# Patient Record
Sex: FEMALE | Race: BLACK OR AFRICAN AMERICAN | NOT HISPANIC OR LATINO
[De-identification: names, ages, dates, MRNs, and addresses within clinical notes are randomized per-mention and may not be internally consistent; named-entity substitution may affect disease eponyms.]

---

## 2022-05-26 ENCOUNTER — RESULT REVIEW (OUTPATIENT)
Age: 52
End: 2022-05-26

## 2022-06-07 ENCOUNTER — APPOINTMENT (OUTPATIENT)
Dept: MAMMOGRAPHY | Facility: HOSPITAL | Age: 52
End: 2022-06-07

## 2022-07-11 PROBLEM — Z00.00 ENCOUNTER FOR PREVENTIVE HEALTH EXAMINATION: Status: ACTIVE | Noted: 2022-07-11

## 2022-07-12 ENCOUNTER — OUTPATIENT (OUTPATIENT)
Dept: OUTPATIENT SERVICES | Facility: HOSPITAL | Age: 52
LOS: 1 days | End: 2022-07-12

## 2022-07-12 VITALS
TEMPERATURE: 98 F | DIASTOLIC BLOOD PRESSURE: 70 MMHG | SYSTOLIC BLOOD PRESSURE: 110 MMHG | HEART RATE: 77 BPM | OXYGEN SATURATION: 98 % | WEIGHT: 259.93 LBS | HEIGHT: 64.5 IN | RESPIRATION RATE: 16 BRPM

## 2022-07-12 DIAGNOSIS — Z98.891 HISTORY OF UTERINE SCAR FROM PREVIOUS SURGERY: Chronic | ICD-10-CM

## 2022-07-12 DIAGNOSIS — D05.11 INTRADUCTAL CARCINOMA IN SITU OF RIGHT BREAST: ICD-10-CM

## 2022-07-12 DIAGNOSIS — C50.919 MALIGNANT NEOPLASM OF UNSPECIFIED SITE OF UNSPECIFIED FEMALE BREAST: ICD-10-CM

## 2022-07-12 DIAGNOSIS — Z98.890 OTHER SPECIFIED POSTPROCEDURAL STATES: Chronic | ICD-10-CM

## 2022-07-12 LAB
ALBUMIN SERPL ELPH-MCNC: 4.3 G/DL — SIGNIFICANT CHANGE UP (ref 3.3–5)
ALP SERPL-CCNC: 96 U/L — SIGNIFICANT CHANGE UP (ref 40–120)
ALT FLD-CCNC: 31 U/L — SIGNIFICANT CHANGE UP (ref 4–33)
ANION GAP SERPL CALC-SCNC: 11 MMOL/L — SIGNIFICANT CHANGE UP (ref 7–14)
AST SERPL-CCNC: 25 U/L — SIGNIFICANT CHANGE UP (ref 4–32)
BILIRUB SERPL-MCNC: 0.3 MG/DL — SIGNIFICANT CHANGE UP (ref 0.2–1.2)
BUN SERPL-MCNC: 17 MG/DL — SIGNIFICANT CHANGE UP (ref 7–23)
CALCIUM SERPL-MCNC: 9.4 MG/DL — SIGNIFICANT CHANGE UP (ref 8.4–10.5)
CHLORIDE SERPL-SCNC: 102 MMOL/L — SIGNIFICANT CHANGE UP (ref 98–107)
CO2 SERPL-SCNC: 27 MMOL/L — SIGNIFICANT CHANGE UP (ref 22–31)
CREAT SERPL-MCNC: 0.84 MG/DL — SIGNIFICANT CHANGE UP (ref 0.5–1.3)
EGFR: 83 ML/MIN/1.73M2 — SIGNIFICANT CHANGE UP
GLUCOSE SERPL-MCNC: 77 MG/DL — SIGNIFICANT CHANGE UP (ref 70–99)
HCG SERPL-ACNC: <5 MIU/ML — SIGNIFICANT CHANGE UP
HCT VFR BLD CALC: 38.4 % — SIGNIFICANT CHANGE UP (ref 34.5–45)
HGB BLD-MCNC: 12.1 G/DL — SIGNIFICANT CHANGE UP (ref 11.5–15.5)
MCHC RBC-ENTMCNC: 28.8 PG — SIGNIFICANT CHANGE UP (ref 27–34)
MCHC RBC-ENTMCNC: 31.5 GM/DL — LOW (ref 32–36)
MCV RBC AUTO: 91.4 FL — SIGNIFICANT CHANGE UP (ref 80–100)
NRBC # BLD: 0 /100 WBCS — SIGNIFICANT CHANGE UP
NRBC # FLD: 0 K/UL — SIGNIFICANT CHANGE UP
PLATELET # BLD AUTO: 209 K/UL — SIGNIFICANT CHANGE UP (ref 150–400)
POTASSIUM SERPL-MCNC: 4 MMOL/L — SIGNIFICANT CHANGE UP (ref 3.5–5.3)
POTASSIUM SERPL-SCNC: 4 MMOL/L — SIGNIFICANT CHANGE UP (ref 3.5–5.3)
PROT SERPL-MCNC: 8 G/DL — SIGNIFICANT CHANGE UP (ref 6–8.3)
RBC # BLD: 4.2 M/UL — SIGNIFICANT CHANGE UP (ref 3.8–5.2)
RBC # FLD: 13.7 % — SIGNIFICANT CHANGE UP (ref 10.3–14.5)
SODIUM SERPL-SCNC: 140 MMOL/L — SIGNIFICANT CHANGE UP (ref 135–145)
WBC # BLD: 6.48 K/UL — SIGNIFICANT CHANGE UP (ref 3.8–10.5)
WBC # FLD AUTO: 6.48 K/UL — SIGNIFICANT CHANGE UP (ref 3.8–10.5)

## 2022-07-12 PROCEDURE — 93010 ELECTROCARDIOGRAM REPORT: CPT

## 2022-07-12 NOTE — H&P PST ADULT - NSICDXPASTMEDICALHX_GEN_ALL_CORE_FT
PAST MEDICAL HISTORY:  2019 novel coronavirus disease (COVID-19)     Breast CA right    HTN (hypertension)     Obesity     Osteoarthritis

## 2022-07-12 NOTE — H&P PST ADULT - PROBLEM SELECTOR PLAN 1
Pt scheduled for surgery Right Breast Lumpectomy with Mammo Localization Right Breast Ductal Ca in Situ  1 Site with Dr Jackson tentatively 7/21/22  and preop instructions including instructions for taking Famotidine and for Chlorhexidine use in showering on the day of surgery, given verbally and with use of  written materials, and patient confirming understanding of such instructions using  teach back method.  Pt given information for COVID PCR testing sites and told to make appointment 3-5 days preop   Pt takes HTN meds in evening

## 2022-07-12 NOTE — H&P PST ADULT - HISTORY OF PRESENT ILLNESS
Pt is a 51 yr old female scheduled for   Pt given information for COVID PCR testing sites and told to make appointment 3-5 days preop  Pt is a 51 yr old female scheduled for Right Breast Lumpectomy with Mammo Localization Right Breast Ductal Ca in Situ  1 Site with Dr Jackson tentatively 7/21/22 - pt noted to have abnormal right breast mass on routine Mammo 4/22 - pt denies palpation or pain or breast changes. Hx HTN   Pt given information for COVID PCR testing sites and told to make appointment 3-5 days preop

## 2022-07-12 NOTE — H&P PST ADULT - NSANTHOSAYNRD_GEN_A_CORE
No. JH screening performed.  STOP BANG Legend: 0-2 = LOW Risk; 3-4 = INTERMEDIATE Risk; 5-8 = HIGH Risk

## 2022-07-12 NOTE — H&P PST ADULT - ACTIVITY
Pt states he can walk 3 to 4 blocks or climb 2 flights stairs without SOB or CP Pt states she can walk 3 to 4 blocks or climb 2 flights stairs without SOB or CP

## 2022-07-20 ENCOUNTER — TRANSCRIPTION ENCOUNTER (OUTPATIENT)
Age: 52
End: 2022-07-20

## 2022-07-20 VITALS
RESPIRATION RATE: 18 BRPM | SYSTOLIC BLOOD PRESSURE: 103 MMHG | HEIGHT: 64.5 IN | WEIGHT: 259.93 LBS | TEMPERATURE: 97 F | OXYGEN SATURATION: 100 % | DIASTOLIC BLOOD PRESSURE: 68 MMHG | HEART RATE: 60 BPM

## 2022-07-20 NOTE — ASU PREOPERATIVE ASSESSMENT, ADULT (IPARK ONLY) - TEACHING/LEARNING FACTORS IMPACT ABILITY TO LEARN
The patient has received written discharge instructions for Warfarin/Coumadin, including the Warfarin/Coumadin discharge booklet, which contains all of the information listed below. Warfarin/Coumadin is used to prevent new blood clots and keep existing ones from getting bigger. Never skip a dose of Warfarin/Coumadin. If you forget to take your Warfarin/Coumadin, DO NOT take an extra pill to 'catch up'. NEVER TAKE A DOUBLE DOSE. Notify your doctor that you missed a dose. Take Warfarin/Coumadin in the evening at the same time. Warfarin/Coumadin may be taken with other medications or food.
none

## 2022-07-20 NOTE — ASU PREOPERATIVE ASSESSMENT, ADULT (IPARK ONLY) - FALL HARM RISK - UNIVERSAL INTERVENTIONS
Bed in lowest position, wheels locked, appropriate side rails in place/Call bell, personal items and telephone in reach/Instruct patient to call for assistance before getting out of bed or chair/Non-slip footwear when patient is out of bed/[place] to call system/Physically safe environment - no spills, clutter or unnecessary equipment/Purposeful Proactive Rounding/Room/bathroom lighting operational, light cord in reach

## 2022-07-21 ENCOUNTER — APPOINTMENT (OUTPATIENT)
Dept: MAMMOGRAPHY | Facility: IMAGING CENTER | Age: 52
End: 2022-07-21

## 2022-07-21 ENCOUNTER — OUTPATIENT (OUTPATIENT)
Dept: OUTPATIENT SERVICES | Facility: HOSPITAL | Age: 52
LOS: 1 days | Discharge: HOME | End: 2022-07-21

## 2022-07-21 ENCOUNTER — TRANSCRIPTION ENCOUNTER (OUTPATIENT)
Age: 52
End: 2022-07-21

## 2022-07-21 ENCOUNTER — RESULT REVIEW (OUTPATIENT)
Age: 52
End: 2022-07-21

## 2022-07-21 ENCOUNTER — OUTPATIENT (OUTPATIENT)
Dept: OUTPATIENT SERVICES | Facility: HOSPITAL | Age: 52
LOS: 1 days | End: 2022-07-21
Payer: COMMERCIAL

## 2022-07-21 VITALS
DIASTOLIC BLOOD PRESSURE: 68 MMHG | OXYGEN SATURATION: 99 % | HEART RATE: 68 BPM | SYSTOLIC BLOOD PRESSURE: 115 MMHG | RESPIRATION RATE: 14 BRPM

## 2022-07-21 DIAGNOSIS — Z00.8 ENCOUNTER FOR OTHER GENERAL EXAMINATION: ICD-10-CM

## 2022-07-21 DIAGNOSIS — Z98.890 OTHER SPECIFIED POSTPROCEDURAL STATES: Chronic | ICD-10-CM

## 2022-07-21 DIAGNOSIS — Z98.891 HISTORY OF UTERINE SCAR FROM PREVIOUS SURGERY: Chronic | ICD-10-CM

## 2022-07-21 DIAGNOSIS — D05.11 INTRADUCTAL CARCINOMA IN SITU OF RIGHT BREAST: ICD-10-CM

## 2022-07-21 PROBLEM — I10 ESSENTIAL (PRIMARY) HYPERTENSION: Chronic | Status: ACTIVE | Noted: 2022-07-12

## 2022-07-21 PROBLEM — U07.1 COVID-19: Chronic | Status: ACTIVE | Noted: 2022-07-12

## 2022-07-21 PROBLEM — E66.9 OBESITY, UNSPECIFIED: Chronic | Status: ACTIVE | Noted: 2022-07-12

## 2022-07-21 PROBLEM — C50.919 MALIGNANT NEOPLASM OF UNSPECIFIED SITE OF UNSPECIFIED FEMALE BREAST: Chronic | Status: ACTIVE | Noted: 2022-07-12

## 2022-07-21 PROBLEM — M19.90 UNSPECIFIED OSTEOARTHRITIS, UNSPECIFIED SITE: Chronic | Status: ACTIVE | Noted: 2022-07-12

## 2022-07-21 PROCEDURE — 88341 IMHCHEM/IMCYTCHM EA ADD ANTB: CPT | Mod: 26

## 2022-07-21 PROCEDURE — 76098 X-RAY EXAM SURGICAL SPECIMEN: CPT | Mod: 26

## 2022-07-21 PROCEDURE — 88342 IMHCHEM/IMCYTCHM 1ST ANTB: CPT | Mod: 26

## 2022-07-21 PROCEDURE — 19281 PERQ DEVICE BREAST 1ST IMAG: CPT | Mod: RT

## 2022-07-21 PROCEDURE — C1889: CPT

## 2022-07-21 PROCEDURE — C1739: CPT

## 2022-07-21 PROCEDURE — 19281 PERQ DEVICE BREAST 1ST IMAG: CPT

## 2022-07-21 PROCEDURE — 76098 X-RAY EXAM SURGICAL SPECIMEN: CPT

## 2022-07-21 PROCEDURE — 88307 TISSUE EXAM BY PATHOLOGIST: CPT | Mod: 26

## 2022-07-21 NOTE — BRIEF OPERATIVE NOTE - OPERATION/FINDINGS
Mammography scans were studied prior to beginning the procedure. The patient was prepped and draped in a sterile fashion. An incision was made transversely across the breast tissue. Using electrocautery, we dissected to and identified the localizing wire. The tumor and wire were excised. The tumor was sent to radiology to confirm clips in specimen. We closed the incision with chromic gut for the deep dermal layers and monocryl on the skin layer.

## 2022-07-21 NOTE — ASU DISCHARGE PLAN (ADULT/PEDIATRIC) - CARE PROVIDER_API CALL
Pennie Jackson)  Surgery  [address], [address]  [place] [address]  Phone: [phone]  Fax: [phone]  Follow Up Time: 2 weeks

## 2022-07-21 NOTE — ASU DISCHARGE PLAN (ADULT/PEDIATRIC) - ASU DC SPECIAL INSTRUCTIONSFT
Take Tylenol up to 975mg and Ibuprofen up to 600mg every 6 hours, alternating so that you take one medication, then the other every 3 hours. Use Oxycodone only for severe pain not controlled with the Tylenol and Ibuprofen.     You may shower. Pat Dry abdomen. Leave the white steri strips in place, they will fall off on their own in approximately 5-7 days.

## 2022-07-21 NOTE — ASU DISCHARGE PLAN (ADULT/PEDIATRIC) - NS MD DC FALL RISK RISK
For information on Fall & Injury Prevention, visit: [email or web address] OR  [email or web address] OR  https://www.cdc.gov/steadi/patient.html

## 2022-07-27 LAB — SURGICAL PATHOLOGY STUDY: SIGNIFICANT CHANGE UP

## 2022-08-05 ENCOUNTER — OUTPATIENT (OUTPATIENT)
Dept: OUTPATIENT SERVICES | Facility: HOSPITAL | Age: 52
LOS: 1 days | End: 2022-08-05

## 2022-08-05 VITALS
DIASTOLIC BLOOD PRESSURE: 82 MMHG | HEART RATE: 75 BPM | SYSTOLIC BLOOD PRESSURE: 126 MMHG | WEIGHT: 259.93 LBS | RESPIRATION RATE: 16 BRPM | HEIGHT: 64.5 IN | OXYGEN SATURATION: 98 % | TEMPERATURE: 98 F

## 2022-08-05 DIAGNOSIS — C50.911 MALIGNANT NEOPLASM OF UNSPECIFIED SITE OF RIGHT FEMALE BREAST: ICD-10-CM

## 2022-08-05 DIAGNOSIS — C50.919 MALIGNANT NEOPLASM OF UNSPECIFIED SITE OF UNSPECIFIED FEMALE BREAST: ICD-10-CM

## 2022-08-05 DIAGNOSIS — Z98.890 OTHER SPECIFIED POSTPROCEDURAL STATES: Chronic | ICD-10-CM

## 2022-08-05 DIAGNOSIS — Z98.891 HISTORY OF UTERINE SCAR FROM PREVIOUS SURGERY: Chronic | ICD-10-CM

## 2022-08-05 LAB
HCG SERPL-ACNC: <5 MIU/ML — SIGNIFICANT CHANGE UP
HCT VFR BLD CALC: 37.5 % — SIGNIFICANT CHANGE UP (ref 34.5–45)
HGB BLD-MCNC: 11.8 G/DL — SIGNIFICANT CHANGE UP (ref 11.5–15.5)
MCHC RBC-ENTMCNC: 28.6 PG — SIGNIFICANT CHANGE UP (ref 27–34)
MCHC RBC-ENTMCNC: 31.5 GM/DL — LOW (ref 32–36)
MCV RBC AUTO: 91 FL — SIGNIFICANT CHANGE UP (ref 80–100)
NRBC # BLD: 0 /100 WBCS — SIGNIFICANT CHANGE UP
NRBC # FLD: 0 K/UL — SIGNIFICANT CHANGE UP
PLATELET # BLD AUTO: 204 K/UL — SIGNIFICANT CHANGE UP (ref 150–400)
RBC # BLD: 4.12 M/UL — SIGNIFICANT CHANGE UP (ref 3.8–5.2)
RBC # FLD: 14 % — SIGNIFICANT CHANGE UP (ref 10.3–14.5)
WBC # BLD: 6.72 K/UL — SIGNIFICANT CHANGE UP (ref 3.8–10.5)
WBC # FLD AUTO: 6.72 K/UL — SIGNIFICANT CHANGE UP (ref 3.8–10.5)

## 2022-08-05 RX ORDER — SODIUM CHLORIDE 9 MG/ML
1000 INJECTION, SOLUTION INTRAVENOUS
Refills: 0 | Status: DISCONTINUED | OUTPATIENT
Start: 2022-08-16 | End: 2022-08-30

## 2022-08-05 NOTE — H&P PST ADULT - BREASTS COMMENTS
No redness or swelling noted at biopsy site right lateral breast Surgical incision healing well - no drainage or bleeding - pt admits to occasional discomfort

## 2022-08-05 NOTE — H&P PST ADULT - SKIN/BREAST COMMENTS
S/P right breast lumpectomy - surgical scar healed - no drainage noted - pt c/o of mild pain at times - right axillary lymph node to be biopsied and additional breast tissue removed to get clean margins

## 2022-08-05 NOTE — H&P PST ADULT - HISTORY OF PRESENT ILLNESS
Pt is a 51 yr old female scheduled for Right Breast Lumpectomy with Mammo Localization Right Breast Ductal Ca in Situ  1 Site with Dr Jackson tentatively 7/21/22 - pt noted to have abnormal right breast mass on routine Mammo 4/22 - pt denies palpation or pain or breast changes. Hx HTN   Pt given information for COVID PCR testing sites and told to make appointment 3-5 days preop  Pt is a 51 yr old female scheduled for Re Excision of Tumor Site Right [place] Node Biopsy Right Breast Ca with Dr Jackson tentatively 8/16/22 - pt s/p right breast lumpectomy 7/21/22 and now needs margins cleared. Pt reports good healing and denies drainage or swelling or surgical incision. Hx HTN, obesity .  Pt given information for COVID PCR testing sites and told to make appointment 3-5 days preop

## 2022-08-05 NOTE — H&P PST ADULT - DECREASED STRENGTH DETAIL
Pt c/o right knee pain with walking / wt bearing - slight swelling noted Pt c/o b/l  knee pain with walking / wt bearing - slight swelling noted left knee

## 2022-08-05 NOTE — H&P PST ADULT - BMI (KG/M2)
Handoff Report from Operating Room to PACU    Report received from GILL Chowdary RN and Prisca Salcedo CRNA regarding Elodia Jorgensen. Surgeon(s):  Refugio Calderon MD  And Procedure(s) (LRB):  RIGHT UNICONDYLAR KNEE ARTHROPLASTY (Right)  confirmed   with allergies, dressings and local anesthetic discussed. Anesthesia type, drugs, patient history, complications, estimated blood loss, vital signs, intake and output, and last pain medication, lines, reversal medications and temperature were reviewed. 43.9

## 2022-08-15 ENCOUNTER — TRANSCRIPTION ENCOUNTER (OUTPATIENT)
Age: 52
End: 2022-08-15

## 2022-08-16 ENCOUNTER — APPOINTMENT (OUTPATIENT)
Dept: NUCLEAR MEDICINE | Facility: HOSPITAL | Age: 52
End: 2022-08-16

## 2022-08-16 ENCOUNTER — OUTPATIENT (OUTPATIENT)
Dept: OUTPATIENT SERVICES | Facility: HOSPITAL | Age: 52
LOS: 1 days | Discharge: HOME | End: 2022-08-16

## 2022-08-16 ENCOUNTER — RESULT REVIEW (OUTPATIENT)
Age: 52
End: 2022-08-16

## 2022-08-16 ENCOUNTER — TRANSCRIPTION ENCOUNTER (OUTPATIENT)
Age: 52
End: 2022-08-16

## 2022-08-16 VITALS
OXYGEN SATURATION: 100 % | HEART RATE: 70 BPM | SYSTOLIC BLOOD PRESSURE: 129 MMHG | DIASTOLIC BLOOD PRESSURE: 83 MMHG | RESPIRATION RATE: 15 BRPM

## 2022-08-16 VITALS
TEMPERATURE: 98 F | HEIGHT: 64.5 IN | SYSTOLIC BLOOD PRESSURE: 121 MMHG | DIASTOLIC BLOOD PRESSURE: 87 MMHG | WEIGHT: 259.93 LBS | OXYGEN SATURATION: 97 % | RESPIRATION RATE: 15 BRPM | HEART RATE: 75 BPM

## 2022-08-16 DIAGNOSIS — Z98.890 OTHER SPECIFIED POSTPROCEDURAL STATES: Chronic | ICD-10-CM

## 2022-08-16 DIAGNOSIS — Z98.891 HISTORY OF UTERINE SCAR FROM PREVIOUS SURGERY: Chronic | ICD-10-CM

## 2022-08-16 DIAGNOSIS — C50.911 MALIGNANT NEOPLASM OF UNSPECIFIED SITE OF RIGHT FEMALE BREAST: ICD-10-CM

## 2022-08-16 LAB — HCG UR QL: NEGATIVE — SIGNIFICANT CHANGE UP

## 2022-08-16 PROCEDURE — 88307 TISSUE EXAM BY PATHOLOGIST: CPT | Mod: 26

## 2022-08-16 PROCEDURE — 88331 PATH CONSLTJ SURG 1 BLK 1SPC: CPT | Mod: 26

## 2022-08-16 PROCEDURE — 88332 PATH CONSLTJ SURG EA ADD BLK: CPT | Mod: 26

## 2022-08-16 RX ORDER — LISINOPRIL 2.5 MG/1
20 TABLET ORAL DAILY
Refills: 0 | Status: DISCONTINUED | OUTPATIENT
Start: 2022-08-16 | End: 2022-08-30

## 2022-08-16 RX ORDER — FENTANYL CITRATE 50 UG/ML
25 INJECTION INTRAVENOUS
Refills: 0 | Status: DISCONTINUED | OUTPATIENT
Start: 2022-08-16 | End: 2022-08-16

## 2022-08-16 RX ORDER — AMLODIPINE BESYLATE 2.5 MG/1
10 TABLET ORAL DAILY
Refills: 0 | Status: DISCONTINUED | OUTPATIENT
Start: 2022-08-16 | End: 2022-08-30

## 2022-08-16 RX ORDER — OXYCODONE HYDROCHLORIDE 5 MG/1
5 TABLET ORAL ONCE
Refills: 0 | Status: DISCONTINUED | OUTPATIENT
Start: 2022-08-16 | End: 2022-08-16

## 2022-08-16 RX ORDER — OXYCODONE HYDROCHLORIDE 5 MG/1
10 TABLET ORAL ONCE
Refills: 0 | Status: DISCONTINUED | OUTPATIENT
Start: 2022-08-16 | End: 2022-08-16

## 2022-08-16 RX ORDER — ACETAMINOPHEN 500 MG
650 TABLET ORAL ONCE
Refills: 0 | Status: COMPLETED | OUTPATIENT
Start: 2022-08-16 | End: 2022-08-16

## 2022-08-16 RX ORDER — ONDANSETRON 8 MG/1
4 TABLET, FILM COATED ORAL ONCE
Refills: 0 | Status: DISCONTINUED | OUTPATIENT
Start: 2022-08-16 | End: 2022-08-30

## 2022-08-16 RX ORDER — FENTANYL CITRATE 50 UG/ML
50 INJECTION INTRAVENOUS
Refills: 0 | Status: DISCONTINUED | OUTPATIENT
Start: 2022-08-16 | End: 2022-08-16

## 2022-08-16 RX ADMIN — Medication 650 MILLIGRAM(S): at 12:14

## 2022-08-16 NOTE — ASU DISCHARGE PLAN (ADULT/PEDIATRIC) - NS MD DC FALL RISK RISK
For information on Fall & Injury Prevention, visit: [email or web address] OR  [email or web address] OR  https://www.cdc.gov/steadi/patient.html

## 2022-08-16 NOTE — ASU DISCHARGE PLAN (ADULT/PEDIATRIC) - CARE PROVIDER_API CALL
Pennie Jackson)  Surgery  [address], [address]  [place] [address]  Phone: [phone]  Fax: [phone]  Follow Up Time:

## 2022-08-16 NOTE — ASU PREOP CHECKLIST - AS BP NONINV SITE
Patient returning my call she stated that she have a referrals for her to see the eyes doctor at [facility], but she stated stated that she will make an appointment before her referrals expires next month  
right upper arm

## 2022-08-16 NOTE — BRIEF OPERATIVE NOTE - NSICDXBRIEFPROCEDURE_GEN_ALL_CORE_FT
PROCEDURES:  Lumpectomy of breast with radioactive seed localization and sentinel node biopsy 16-Aug-2022 10:37:50  Ivan Jackson

## 2022-08-22 LAB — SURGICAL PATHOLOGY STUDY: SIGNIFICANT CHANGE UP

## 2022-09-30 ENCOUNTER — OUTPATIENT (OUTPATIENT)
Dept: OUTPATIENT SERVICES | Facility: HOSPITAL | Age: 52
LOS: 1 days | End: 2022-09-30

## 2022-09-30 VITALS
TEMPERATURE: 97 F | WEIGHT: 263.01 LBS | DIASTOLIC BLOOD PRESSURE: 75 MMHG | RESPIRATION RATE: 16 BRPM | OXYGEN SATURATION: 96 % | HEIGHT: 63.5 IN | SYSTOLIC BLOOD PRESSURE: 111 MMHG | HEART RATE: 83 BPM

## 2022-09-30 DIAGNOSIS — Z98.890 OTHER SPECIFIED POSTPROCEDURAL STATES: Chronic | ICD-10-CM

## 2022-09-30 DIAGNOSIS — Z91.89 OTHER SPECIFIED PERSONAL RISK FACTORS, NOT ELSEWHERE CLASSIFIED: ICD-10-CM

## 2022-09-30 DIAGNOSIS — Z98.891 HISTORY OF UTERINE SCAR FROM PREVIOUS SURGERY: Chronic | ICD-10-CM

## 2022-09-30 DIAGNOSIS — E66.01 MORBID (SEVERE) OBESITY DUE TO EXCESS CALORIES: ICD-10-CM

## 2022-09-30 DIAGNOSIS — I10 ESSENTIAL (PRIMARY) HYPERTENSION: ICD-10-CM

## 2022-09-30 DIAGNOSIS — C50.911 MALIGNANT NEOPLASM OF UNSPECIFIED SITE OF RIGHT FEMALE BREAST: ICD-10-CM

## 2022-09-30 LAB
ALBUMIN SERPL ELPH-MCNC: 4.3 G/DL — SIGNIFICANT CHANGE UP (ref 3.3–5)
ALP SERPL-CCNC: 107 U/L — SIGNIFICANT CHANGE UP (ref 40–120)
ALT FLD-CCNC: 26 U/L — SIGNIFICANT CHANGE UP (ref 4–33)
ANION GAP SERPL CALC-SCNC: 12 MMOL/L — SIGNIFICANT CHANGE UP (ref 7–14)
AST SERPL-CCNC: 22 U/L — SIGNIFICANT CHANGE UP (ref 4–32)
BILIRUB SERPL-MCNC: <0.2 MG/DL — SIGNIFICANT CHANGE UP (ref 0.2–1.2)
BUN SERPL-MCNC: 20 MG/DL — SIGNIFICANT CHANGE UP (ref 7–23)
CALCIUM SERPL-MCNC: 9.2 MG/DL — SIGNIFICANT CHANGE UP (ref 8.4–10.5)
CHLORIDE SERPL-SCNC: 105 MMOL/L — SIGNIFICANT CHANGE UP (ref 98–107)
CO2 SERPL-SCNC: 25 MMOL/L — SIGNIFICANT CHANGE UP (ref 22–31)
CREAT SERPL-MCNC: 0.72 MG/DL — SIGNIFICANT CHANGE UP (ref 0.5–1.3)
EGFR: 100 ML/MIN/1.73M2 — SIGNIFICANT CHANGE UP
GLUCOSE SERPL-MCNC: 92 MG/DL — SIGNIFICANT CHANGE UP (ref 70–99)
HCG SERPL-ACNC: <5 MIU/ML — SIGNIFICANT CHANGE UP
HCT VFR BLD CALC: 36.5 % — SIGNIFICANT CHANGE UP (ref 34.5–45)
HGB BLD-MCNC: 11.6 G/DL — SIGNIFICANT CHANGE UP (ref 11.5–15.5)
MCHC RBC-ENTMCNC: 29 PG — SIGNIFICANT CHANGE UP (ref 27–34)
MCHC RBC-ENTMCNC: 31.8 GM/DL — LOW (ref 32–36)
MCV RBC AUTO: 91.3 FL — SIGNIFICANT CHANGE UP (ref 80–100)
NRBC # BLD: 0 /100 WBCS — SIGNIFICANT CHANGE UP (ref 0–0)
NRBC # FLD: 0 K/UL — SIGNIFICANT CHANGE UP (ref 0–0)
PLATELET # BLD AUTO: 200 K/UL — SIGNIFICANT CHANGE UP (ref 150–400)
POTASSIUM SERPL-MCNC: 3.6 MMOL/L — SIGNIFICANT CHANGE UP (ref 3.5–5.3)
POTASSIUM SERPL-SCNC: 3.6 MMOL/L — SIGNIFICANT CHANGE UP (ref 3.5–5.3)
PROT SERPL-MCNC: 7.7 G/DL — SIGNIFICANT CHANGE UP (ref 6–8.3)
RBC # BLD: 4 M/UL — SIGNIFICANT CHANGE UP (ref 3.8–5.2)
RBC # FLD: 13.6 % — SIGNIFICANT CHANGE UP (ref 10.3–14.5)
SODIUM SERPL-SCNC: 142 MMOL/L — SIGNIFICANT CHANGE UP (ref 135–145)
WBC # BLD: 5.97 K/UL — SIGNIFICANT CHANGE UP (ref 3.8–10.5)
WBC # FLD AUTO: 5.97 K/UL — SIGNIFICANT CHANGE UP (ref 3.8–10.5)

## 2022-09-30 RX ORDER — SODIUM CHLORIDE 9 MG/ML
1000 INJECTION, SOLUTION INTRAVENOUS
Refills: 0 | Status: DISCONTINUED | OUTPATIENT
Start: 2022-10-06 | End: 2022-10-20

## 2022-09-30 NOTE — H&P PST ADULT - HISTORY OF PRESENT ILLNESS
52 y/o female PMH Obesity HTN presents to presurgical testing with diagnosis of malignant neoplasm of unspecified site of right female breast scheduled for an insertion of infusaport. Pt with history of right breast lumpectomy in 7/2022 and re-excision of tumor from right breast in 8/2022.

## 2022-09-30 NOTE — H&P PST ADULT - MUSCULOSKELETAL
knee pain/ROM intact/no calf tenderness/normal gait/strength 5/5 bilateral upper extremities/decreased strength/abnormal gait details…

## 2022-09-30 NOTE — H&P PST ADULT - NSICDXPASTSURGICALHX_GEN_ALL_CORE_FT
PAST SURGICAL HISTORY:  H/O arthroscopy     H/O:      S/P lumpectomy of breast Right, 2022 and re-excision 2022

## 2022-09-30 NOTE — H&P PST ADULT - PROBLEM SELECTOR PLAN 1
Patient tentatively scheduled for insertion of infusaport for 10/6/22. Pre-op instructions provided. Pt given verbal and written instructions with teach back on chlorhexidine shampoo and pepcid. Urine cup provided for day of procedure pregnancy test. Pt verbalized understanding with return demonstration.     Pt with +home test COVID-19 on 9/1/22, retested 5-6 days later with another home test which was negative.  Pt states surgeon is aware. Pt instructed to obtain a PCR tomorrow and follow up with surgeon.

## 2022-09-30 NOTE — H&P PST ADULT - PROBLEM SELECTOR PLAN 2
Pt instructed to take BP medications as usual the evening prior to procedure.     Pending copy of recent echo and last stress test.

## 2022-10-05 ENCOUNTER — TRANSCRIPTION ENCOUNTER (OUTPATIENT)
Age: 52
End: 2022-10-05

## 2022-10-05 NOTE — ASU PATIENT PROFILE, ADULT - FALL HARM RISK - UNIVERSAL INTERVENTIONS
Bed in lowest position, wheels locked, appropriate side rails in place/Call bell, personal items and telephone in reach/Instruct patient to call for assistance before getting out of bed or chair/Non-slip footwear when patient is out of bed/[place] to call system/Physically safe environment - no spills, clutter or unnecessary equipment/Purposeful Proactive Rounding/Room/bathroom lighting operational, light cord in reach

## 2022-10-06 ENCOUNTER — TRANSCRIPTION ENCOUNTER (OUTPATIENT)
Age: 52
End: 2022-10-06

## 2022-10-06 ENCOUNTER — OUTPATIENT (OUTPATIENT)
Dept: OUTPATIENT SERVICES | Facility: HOSPITAL | Age: 52
LOS: 1 days | Discharge: HOME | End: 2022-10-06

## 2022-10-06 VITALS
WEIGHT: 263.01 LBS | OXYGEN SATURATION: 97 % | HEIGHT: 63.5 IN | SYSTOLIC BLOOD PRESSURE: 114 MMHG | TEMPERATURE: 98 F | DIASTOLIC BLOOD PRESSURE: 71 MMHG | RESPIRATION RATE: 16 BRPM | HEART RATE: 69 BPM

## 2022-10-06 VITALS — RESPIRATION RATE: 15 BRPM | HEART RATE: 71 BPM

## 2022-10-06 DIAGNOSIS — C50.911 MALIGNANT NEOPLASM OF UNSPECIFIED SITE OF RIGHT FEMALE BREAST: ICD-10-CM

## 2022-10-06 DIAGNOSIS — Z98.890 OTHER SPECIFIED POSTPROCEDURAL STATES: Chronic | ICD-10-CM

## 2022-10-06 DIAGNOSIS — Z98.891 HISTORY OF UTERINE SCAR FROM PREVIOUS SURGERY: Chronic | ICD-10-CM

## 2022-10-06 LAB — HCG UR QL: NEGATIVE — SIGNIFICANT CHANGE UP

## 2022-10-06 DEVICE — PORT POWER ISP MRI 9.6SI: Type: IMPLANTABLE DEVICE | Status: FUNCTIONAL

## 2022-10-06 RX ORDER — ASCORBIC ACID 60 MG
1 TABLET,CHEWABLE ORAL
Qty: 0 | Refills: 0 | DISCHARGE

## 2022-10-06 RX ORDER — AMLODIPINE BESYLATE 2.5 MG/1
1 TABLET ORAL
Qty: 0 | Refills: 0 | DISCHARGE

## 2022-10-06 RX ORDER — FENTANYL CITRATE 50 UG/ML
25 INJECTION INTRAVENOUS
Refills: 0 | Status: DISCONTINUED | OUTPATIENT
Start: 2022-10-06 | End: 2022-10-06

## 2022-10-06 RX ORDER — LISINOPRIL 2.5 MG/1
1 TABLET ORAL
Qty: 0 | Refills: 0 | DISCHARGE

## 2022-10-06 RX ORDER — ONDANSETRON 8 MG/1
4 TABLET, FILM COATED ORAL ONCE
Refills: 0 | Status: DISCONTINUED | OUTPATIENT
Start: 2022-10-06 | End: 2022-10-20

## 2022-10-06 RX ORDER — LETROZOLE 2.5 MG/1
1 TABLET, FILM COATED ORAL
Qty: 0 | Refills: 0 | DISCHARGE

## 2022-10-06 RX ORDER — CHOLECALCIFEROL (VITAMIN D3) 125 MCG
1 CAPSULE ORAL
Qty: 0 | Refills: 0 | DISCHARGE

## 2022-10-06 NOTE — BRIEF OPERATIVE NOTE - OPERATION/FINDINGS
Skin area on L chest wall marked with marking pen. Cut down performed to expose cephalic vein. Ties placed at either side and vessel opened. Infusion port catheter threaded into the vein towards R atrium. Flouroscopy used to confirm placement, catheter tip adjusted as needed based on imaging, catheter sutured into place.  Port placed in subcutaneous space inferior to vein, tested with blood draw and saline flush. Heplock administered. Subdermal sutures placed using chromic gut to close wound, subcuticular stich run to close skin.

## 2022-10-06 NOTE — ASU DISCHARGE PLAN (ADULT/PEDIATRIC) - NURSING INSTRUCTIONS
You received IV Tylenol for pain management at __12:15_. Please DO NOT take any Tylenol (Acetaminophen) containing products, such as Vicodin, Percocet, Excedrin, and cold medications for the next 6 hours (until ___ 6:15PM). DO NOT TAKE MORE THAN 3000 MG OF TYLENOL in a 24 hour period.   You received IV Toradol for pain management at _1:15__. Please DO NOT take Motrin/Ibuprofen/Advil/Aleve/NSAIDs (Non-Steroidal Anti-Inflammatory Drugs) for the next 6 hours (until _7:15__ PM).

## 2022-10-06 NOTE — BRIEF OPERATIVE NOTE - NSICDXBRIEFPROCEDURE_GEN_ALL_CORE_FT
PROCEDURES:  Open insertion of infusion device into chest wall 06-Oct-2022 13:58:11  Chester Núñez   PROCEDURES:  Insertion of central venous catheter (CVC) with subcutaneous pump 06-Oct-2022 16:21:12  Chester Núñez

## 2022-10-06 NOTE — ASU DISCHARGE PLAN (ADULT/PEDIATRIC) - PAIN MANAGEMENT
Okay to alternate tylenol 650mg and ibuprofen 400mg every 3 hours as needed for pain. Please do not take pain medications more often than every 3 hours./Take over the counter pain medication

## 2022-10-06 NOTE — ASU DISCHARGE PLAN (ADULT/PEDIATRIC) - CARE PROVIDER_API CALL
Pennie Jackson)  Surgery  [address], [address]  [place] [address]  Phone: [phone]  Fax: [phone]  Established Patient  Follow Up Time: 1 week   Full range of motion of upper and lower extremities, no joint tenderness/swelling.

## 2022-10-06 NOTE — ASU DISCHARGE PLAN (ADULT/PEDIATRIC) - NS MD DC FALL RISK RISK
For information on Fall & Injury Prevention, visit: [email or web address] OR  [email or web address] OR  https://www.cdc.gov/steadi/patient.html

## 2023-11-02 NOTE — ASU PATIENT PROFILE, ADULT - ABUSE SCREEN COMMENT, PROFILE
normal/ROM intact/normal gait/strength 5/5 bilateral upper extremities/strength 5/5 bilateral lower extremities negative n/a

## 2024-10-11 ENCOUNTER — OUTPATIENT (OUTPATIENT)
Dept: OUTPATIENT SERVICES | Facility: HOSPITAL | Age: 54
LOS: 1 days | End: 2024-10-11

## 2024-10-11 VITALS
HEART RATE: 75 BPM | TEMPERATURE: 98 F | DIASTOLIC BLOOD PRESSURE: 87 MMHG | HEIGHT: 63 IN | WEIGHT: 235.01 LBS | OXYGEN SATURATION: 98 % | SYSTOLIC BLOOD PRESSURE: 131 MMHG | RESPIRATION RATE: 18 BRPM

## 2024-10-11 DIAGNOSIS — C50.911 MALIGNANT NEOPLASM OF UNSPECIFIED SITE OF RIGHT FEMALE BREAST: ICD-10-CM

## 2024-10-11 DIAGNOSIS — Z98.890 OTHER SPECIFIED POSTPROCEDURAL STATES: Chronic | ICD-10-CM

## 2024-10-11 DIAGNOSIS — E66.01 MORBID (SEVERE) OBESITY DUE TO EXCESS CALORIES: ICD-10-CM

## 2024-10-11 DIAGNOSIS — Z98.891 HISTORY OF UTERINE SCAR FROM PREVIOUS SURGERY: Chronic | ICD-10-CM

## 2024-10-11 DIAGNOSIS — E11.9 TYPE 2 DIABETES MELLITUS WITHOUT COMPLICATIONS: ICD-10-CM

## 2024-10-11 NOTE — H&P PST ADULT - NEGATIVE NEUROLOGICAL SYMPTOMS
no transient paralysis/no weakness/no paresthesias/no generalized seizures/no syncope/no tremors/no vertigo/no difficulty walking/no headache

## 2024-10-11 NOTE — H&P PST ADULT - RESPIRATORY
clear to auscultation bilaterally/no wheezes/no rales/no rhonchi/no respiratory distress/no subcutaneous emphysema/airway patent/breath sounds equal/respirations non-labored clear to auscultation bilaterally/no wheezes/no rales/no rhonchi/no respiratory distress/no subcutaneous emphysema/airway patent/breath sounds equal/good air movement/respirations non-labored

## 2024-10-11 NOTE — H&P PST ADULT - NEGATIVE GENERAL SYMPTOMS
no fever/no chills/no sweating/no anorexia/no weight loss/no weight gain no fever/no chills/no sweating/no anorexia/no weight loss/no weight gain/no malaise

## 2024-10-11 NOTE — H&P PST ADULT - ENDOCRINE COMMENTS
Price (Do Not Change): 0.00 Detail Level: Simple Instructions: This plan will send the code FBSE to the PM system.  DO NOT or CHANGE the price. Detail Level: Generalized Pt self monitor BS 2 x day; thyroid nodule noted on Carotid Doppler 09/218/24

## 2024-10-11 NOTE — H&P PST ADULT - PROBLEM SELECTOR PLAN 1
Schedule for removal of power port right breast cancer tentatively on 10/18/24. Pre op instructions, famotidine, chlorhexidine gluconate soap given and explained. Pt verbalized understanding.

## 2024-10-11 NOTE — H&P PST ADULT - NSICDXPASTSURGICALHX_GEN_ALL_CORE_FT
PAST SURGICAL HISTORY:  H/O arthroscopy     H/O:      S/P lumpectomy of breast Right, 2022 and re-excision 2022     PAST SURGICAL HISTORY:  H/O arthroscopy of right knee     H/O:      History of infusaport central venous catheter insertion     S/P lumpectomy of breast Right, 2022 and re-excision 2022

## 2024-10-11 NOTE — H&P PST ADULT - OTHER CARE PROVIDERS
Dr. Josias Fischer (Cardio) [phone] Cardio;; Juice Pink (Endo) [phone] Dr. Josias Fischer (Cardio) [phone] Cardio; Juice Pink (Endo) [phone]

## 2024-10-11 NOTE — H&P PST ADULT - NSICDXPASTMEDICALHX_GEN_ALL_CORE_FT
PAST MEDICAL HISTORY:  2019 novel coronavirus disease (COVID-19)     Breast CA right    HTN (hypertension)     Obesity     Osteoarthritis     S/P radiation therapy     Status post chemotherapy     Type 2 diabetes mellitus      PAST MEDICAL HISTORY:  2019 novel coronavirus disease (COVID-19)     Breast CA right    HTN (hypertension)     Morbid obesity     Osteoarthritis     S/P radiation therapy     Status post chemotherapy     Type 2 diabetes mellitus

## 2024-10-11 NOTE — H&P PST ADULT - PROBLEM SELECTOR PLAN 2
Monitor BS A.M of surgery. Pt instructed to hold Ozempic 1 week prior to surgery, as per pt, last dose 09/28/24. Pt also instructed not to take any diabetes medications on the day of surgery. Pt verbalized understanding.

## 2024-10-11 NOTE — H&P PST ADULT - HISTORY OF PRESENT ILLNESS
54 y/o female with Hx of HTN, DM type 2, Obesity, malignant neoplasm of unspecified site of right female breast- S/P right breast lumpectomy in 7/2022 and re-excision of tumor from right breast in 8/2022.       S/P Chemotherapy completed 04/17/2023, 06/29/2023    54 y/o female with Hx of HTN, DM type 2, Obesity, malignant neoplasm of unspecified site of right female breast- S/P right breast lumpectomy in 7/2022 and re-excision of tumor from right breast in 8/2022. S/p Yzbeh-s-snti insertion 09/2022. S/P Chemotherapy completed 04/17/2023, radiation therapy completed 06/29/2023. Pt presents today for pre op evaluation for removal of power port right breast cancer tentatively on 10/18/24

## 2024-10-11 NOTE — H&P PST ADULT - GENITOURINARY
(0) lying quietly, normal position, moves easily/(0) no particular expression or smile/(0) content, relaxed/(0) no cry (awake or asleep)/(0) normal position or relaxed details…

## 2024-10-11 NOTE — H&P PST ADULT - NEGATIVE ENMT SYMPTOMS
no hearing difficulty/no ear pain/no tinnitus/no vertigo/no sinus symptoms/no post-nasal discharge/no nose bleeds/no throat pain/no dysphagia

## 2024-10-17 NOTE — ASU PATIENT PROFILE, ADULT - NSICDXPASTSURGICALHX_GEN_ALL_CORE_FT
PAST SURGICAL HISTORY:  H/O arthroscopy of right knee     H/O:      History of infusaport central venous catheter insertion     S/P lumpectomy of breast Right, 2022 and re-excision 2022

## 2024-10-17 NOTE — ASU PATIENT PROFILE, ADULT - FALL HARM RISK - UNIVERSAL INTERVENTIONS
Bed in lowest position, wheels locked, appropriate side rails in place/Call bell, personal items and telephone in reach/Instruct patient to call for assistance before getting out of bed or chair/Non-slip footwear when patient is out of bed/[place] to call system/Physically safe environment - no spills, clutter or unnecessary equipment/Purposeful Proactive Rounding/Room/bathroom lighting operational, light cord in reach

## 2024-10-17 NOTE — ASU PATIENT PROFILE, ADULT - NSICDXPASTMEDICALHX_GEN_ALL_CORE_FT
PAST MEDICAL HISTORY:  2019 novel coronavirus disease (COVID-19)     Breast CA right    HTN (hypertension)     Morbid obesity     Osteoarthritis     S/P radiation therapy     Status post chemotherapy     Type 2 diabetes mellitus

## 2024-10-17 NOTE — ASU PATIENT PROFILE, ADULT - BLOOD TRANSFUSION, PREVIOUS, PROFILE
----- Message from Martine Islas DO sent at 5/9/2024  8:13 AM EDT -----  Patient not active on mychart.  Can we please call and recommend she review my message online:    A1c has improved to 5.9%  Cholesterol is still high.  Vitamin D is low       no

## 2024-10-18 ENCOUNTER — OUTPATIENT (OUTPATIENT)
Dept: INPATIENT UNIT | Facility: HOSPITAL | Age: 54
LOS: 1 days | Discharge: HOME | End: 2024-10-18
Payer: COMMERCIAL

## 2024-10-18 ENCOUNTER — TRANSCRIPTION ENCOUNTER (OUTPATIENT)
Age: 54
End: 2024-10-18

## 2024-10-18 VITALS
HEIGHT: 63 IN | OXYGEN SATURATION: 99 % | HEART RATE: 75 BPM | RESPIRATION RATE: 16 BRPM | SYSTOLIC BLOOD PRESSURE: 128 MMHG | WEIGHT: 235.01 LBS | DIASTOLIC BLOOD PRESSURE: 85 MMHG | TEMPERATURE: 98 F

## 2024-10-18 VITALS
OXYGEN SATURATION: 100 % | DIASTOLIC BLOOD PRESSURE: 70 MMHG | RESPIRATION RATE: 16 BRPM | HEART RATE: 70 BPM | SYSTOLIC BLOOD PRESSURE: 121 MMHG

## 2024-10-18 DIAGNOSIS — Z98.891 HISTORY OF UTERINE SCAR FROM PREVIOUS SURGERY: Chronic | ICD-10-CM

## 2024-10-18 DIAGNOSIS — Z98.890 OTHER SPECIFIED POSTPROCEDURAL STATES: Chronic | ICD-10-CM

## 2024-10-18 DIAGNOSIS — C50.911 MALIGNANT NEOPLASM OF UNSPECIFIED SITE OF RIGHT FEMALE BREAST: ICD-10-CM

## 2024-10-18 LAB — GLUCOSE BLDC GLUCOMTR-MCNC: 95 MG/DL — SIGNIFICANT CHANGE UP (ref 70–99)

## 2024-10-18 PROCEDURE — 88300 SURGICAL PATH GROSS: CPT | Mod: 26

## 2024-10-18 RX ORDER — SODIUM CHLORIDE IRRIG SOLUTION 0.9 %
1000 SOLUTION, IRRIGATION IRRIGATION
Refills: 0 | Status: DISCONTINUED | OUTPATIENT
Start: 2024-10-18 | End: 2024-10-18

## 2024-10-18 RX ORDER — SEMAGLUTIDE 1.34 MG/ML
2 INJECTION, SOLUTION SUBCUTANEOUS
Refills: 0 | DISCHARGE

## 2024-10-18 RX ORDER — METFORMIN HCL 500 MG
1 TABLET ORAL
Refills: 0 | DISCHARGE

## 2024-10-18 NOTE — ASU DISCHARGE PLAN (ADULT/PEDIATRIC) - FINANCIAL ASSISTANCE
[facility] provides services at a reduced cost to those who are determined to be eligible through [facility]’s financial assistance program. Information regarding [facility]’s financial assistance program can be found by going to [email or web address] or by calling [phone].

## 2024-10-18 NOTE — ASU DISCHARGE PLAN (ADULT/PEDIATRIC) - CARE PROVIDER_API CALL
Edwin Jackson  Surgery  [address], [address]  [place] [address]  Phone: [phone]  Fax: [phone]  Established Patient  Follow Up Time: 2 weeks

## 2024-10-18 NOTE — ASU DISCHARGE PLAN (ADULT/PEDIATRIC) - PAIN MANAGEMENT
Alternate between Tylenol 500mg and ibuprofen 600mg as needed for pain related to surgery. Do not exceed 4000mg Tylenol intake within a 24 hour period./Take over the counter pain medication

## 2024-10-18 NOTE — BRIEF OPERATIVE NOTE - NSICDXBRIEFPREOP_GEN_ALL_CORE_FT
PRE-OP DIAGNOSIS:  History of insertion of tunneled central venous catheter (CVC) with port 18-Oct-2024 08:45:40  Danyelle Dorman

## 2024-10-18 NOTE — ASU DISCHARGE PLAN (ADULT/PEDIATRIC) - ASU DC SPECIAL INSTRUCTIONSFT
There are white strips called Steristrips currently overlying your surgical incision. Please do not remove them until your follow-up appointment with Dr. Jackson. You may shower, but do not scrub over the Steristrips.    Please call Dr. Jackson's office to make a follow-up appointment 10-14 days after surgery.

## 2024-10-18 NOTE — ASU DISCHARGE PLAN (ADULT/PEDIATRIC) - NS MD DC FALL RISK RISK
For information on Fall & Injury Prevention, visit: [email or web address] OR  [email or web address] OR  https://www.cdc.gov/steadi/patient.html

## 2024-10-23 LAB — SURGICAL PATHOLOGY STUDY: SIGNIFICANT CHANGE UP
